# Patient Record
Sex: FEMALE
[De-identification: names, ages, dates, MRNs, and addresses within clinical notes are randomized per-mention and may not be internally consistent; named-entity substitution may affect disease eponyms.]

---

## 2020-10-18 ENCOUNTER — HOSPITAL ENCOUNTER (EMERGENCY)
Dept: HOSPITAL 5 - ED | Age: 25
Discharge: HOME | End: 2020-10-18
Payer: SELF-PAY

## 2020-10-18 VITALS — DIASTOLIC BLOOD PRESSURE: 83 MMHG | SYSTOLIC BLOOD PRESSURE: 143 MMHG

## 2020-10-18 DIAGNOSIS — Z88.8: ICD-10-CM

## 2020-10-18 DIAGNOSIS — A08.4: Primary | ICD-10-CM

## 2020-10-18 DIAGNOSIS — K21.9: ICD-10-CM

## 2020-10-18 DIAGNOSIS — Z79.899: ICD-10-CM

## 2020-10-18 DIAGNOSIS — F12.90: ICD-10-CM

## 2020-10-18 PROCEDURE — 99282 EMERGENCY DEPT VISIT SF MDM: CPT

## 2020-10-18 NOTE — EMERGENCY DEPARTMENT REPORT
ED General Adult HPI





- General


Chief complaint: Abdominal Pain


Stated complaint: ABDOMINAL PAIN


Time Seen by Provider: 10/18/20 10:39


Source: patient


Mode of arrival: Ambulatory


Limitations: No Limitations





- History of Present Illness


Initial comments: 





25-year-old transgender female patient presents with complaints of sudden onset 

of nausea/vomiting/diarrhea 3 AM. x patient states her friend is here in the ED 

with the same symptoms.  She reports they both ate seafood around 9 PM last 

night.  She denies any hematemesis/coffee-ground emesis, melena/hematochezia, 

fever/chills/sweats, cough, shortness of breath, chest pain, or urinary 

symptoms.  She states she has mild abdominal pain and rates it as a 3/10 in 

severity and describes it as a cramping nauseous feeling.  Patient also reports 

that her symptoms have significantly improved over the last 2 hours and that her

stools are becoming more solid.  She states her last episode of vomiting was 5 

hours ago.





- Related Data


                                  Previous Rx's











 Medication  Instructions  Recorded  Last Taken  Type


 


Ondansetron [Zofran ODT TAB] 4 mg PO Q8HR PRN #10 tab.constantine 10/18/20 Unknown Rx


 


Pantoprazole [Protonix TAB] 20 mg PO QDAY 10 Days #10 tablet. 10/18/20 Unknown

Rx











                                    Allergies











Allergy/AdvReac Type Severity Reaction Status Date / Time


 


carbamazepine [From Tegretol] Allergy  Rash Verified 10/18/20 08:04














ED Review of Systems


ROS: 


Stated complaint: ABDOMINAL PAIN


Other details as noted in HPI





Constitutional: denies: chills, diaphoresis, fever, malaise, weakness


ENT: denies: throat pain


Respiratory: denies: cough, shortness of breath


Cardiovascular: denies: chest pain


Endocrine: denies: excessive sweating


Gastrointestinal: abdominal pain, nausea, vomiting, diarrhea.  denies: 

constipation, hematemesis, melena, hematochezia


Genitourinary: denies: urgency, dysuria, frequency, hematuria


Skin: denies: change in color


Neurological: denies: headache


Hematological/Lymphatic: denies: swollen glands





ED Past Medical Hx





- Past Medical History


Additional medical history: REFLUX





- Surgical History


Past Surgical History?: No





- Social History


Smoking Status: Never Smoker


Substance Use Type: Marijuana





- Medications


Home Medications: 


                                Home Medications











 Medication  Instructions  Recorded  Confirmed  Last Taken  Type


 


Ondansetron [Zofran ODT TAB] 4 mg PO Q8HR PRN #10 tab.constantine 10/18/20  Unknown 

Rx


 


Pantoprazole [Protonix TAB] 20 mg PO QDAY 10 Days #10 tablet. 10/18/20  

Unknown Rx














ED Physical Exam





- General


Limitations: No Limitations


General appearance: alert, in no apparent distress





- Head


Head exam: Present: atraumatic, normocephalic





- Eye


Eye exam: Present: normal appearance.  Absent: scleral icterus





- ENT


ENT exam: Present: mucous membranes moist





- Neck


Neck exam: Present: normal inspection





- Respiratory


Respiratory exam: Present: normal lung sounds bilaterally.  Absent: respiratory 

distress





- Cardiovascular


Cardiovascular Exam: Present: regular rate, normal rhythm.  Absent: systolic 

murmur, diastolic murmur, rubs, gallop





- GI/Abdominal


GI/Abdominal exam: Present: soft, tenderness (Minimal epigastric tenderness 

noted), normal bowel sounds.  Absent: distended, guarding, rebound, rigid





- Expanded GI/Abdominal Exam


  ** Expanded


GI/Abdominal exam: Absent: Narvaez's sign, tenderness at [place]bur[place]'s Point





- Extremities Exam


Extremities exam: Present: normal inspection





- Neurological Exam


Neurological exam: Present: alert, oriented X3, normal gait





- Psychiatric


Psychiatric exam: Present: normal affect, normal mood





- Skin


Skin exam: Present: warm, dry, intact, normal color.  Absent: rash, cyanosis, 

diaphoretic, pallor





ED Course


                                   Vital Signs











  10/18/20





  08:06


 


Temperature 98.3 F


 


Pulse Rate 97 H


 


Respiratory 18





Rate 


 


Blood Pressure 143/83


 


O2 Sat by Pulse 97





Oximetry 














ED Medical Decision Making





- Medical Decision Making








25-year-old transgender female patient presents with complaints of sudden onset 

of nausea/vomiting/diarrhea 3 AM. x patient states her friend is here in the ED 

with the same symptoms.  She reports they both ate seafood around 9 PM last 

night.  She denies any hematemesis/coffee-ground emesis, melena/hematochezia, 

fever/chills/sweats, cough, shortness of breath, chest pain, or urinary 

symptoms.  She states she has mild abdominal pain and rates it as a 3/10 in 

severity and describes it as a cramping nauseous feeling.  Patient also reports 

that her symptoms have significantly improved over the last 2 hours and that her

stools are becoming more solid.  She states her last episode of vomiting was 5 

hours ago.


Minimal epigastric tenderness is noted on exam without rebound or guarding.  

Patient appears well.  Patient given GI cocktail, Zofran, and Protonix.  Nausea 

continue without vomiting so patient was then given Reglan and Benadryl states 

she is feeling better.  Her vitals are normal and she is stable for discharge 

home.  Suspect patient's symptoms were due to food poisoning given her history 

and exam.  Zofran given for home.  Patient informed to hydrate with Pedialyte 

and water and follow-up with her PCP.  Strict return precautions were discussed 

in detail with patient who verbalizes understanding.


Critical care attestation.: 


If time is entered above; I have spent that time in minutes in the direct care 

of this critically ill patient, excluding procedure time.








ED Disposition


Clinical Impression: 


 Viral gastroenteritis





Disposition: DC-01 TO HOME OR SELFCARE


Is pt being admited?: No


Condition: Stable


Instructions:  Food Poisoning (ED)


Prescriptions: 


Pantoprazole [Protonix TAB] 20 mg PO QDAY 10 Days #10 tablet.


Ondansetron [Zofran ODT TAB] 4 mg PO Q8HR PRN #10 tab.rapdis


 PRN Reason: Nausea


Referrals: 


ROSA HERNANDEZ [Other] - 3-5 Days